# Patient Record
(demographics unavailable — no encounter records)

---

## 2024-11-01 NOTE — PHYSICAL EXAM
[Well Developed] : well developed [Well Nourished] : well nourished [No Acute Distress] : no acute distress [Normal Conjunctiva] : normal conjunctiva [Normal Venous Pressure] : normal venous pressure [No Carotid Bruit] : no carotid bruit [Normal S1, S2] : normal S1, S2 [No Murmur] : no murmur [No Rub] : no rub [No Gallop] : no gallop [Clear Lung Fields] : clear lung fields [Good Air Entry] : good air entry [No Respiratory Distress] : no respiratory distress  [Soft] : abdomen soft [Non Tender] : non-tender [No Masses/organomegaly] : no masses/organomegaly [Normal Bowel Sounds] : normal bowel sounds [Normal Gait] : normal gait [No Edema] : no edema [No Cyanosis] : no cyanosis [No Clubbing] : no clubbing [No Rash] : no rash [No Skin Lesions] : no skin lesions [Moves all extremities] : moves all extremities [No Focal Deficits] : no focal deficits [Normal Speech] : normal speech [Alert and Oriented] : alert and oriented [Normal memory] : normal memory [Venous varicosities] : venous varicosities

## 2024-11-01 NOTE — REVIEW OF SYSTEMS
[Palpitations] : palpitations [Negative] : Heme/Lymph [Feeling Fatigued] : feeling fatigued [Dizziness] : dizziness

## 2024-11-07 NOTE — ASSESSMENT
[FreeTextEntry1] : # abnorrnal ECG - inf Q waves on ECG - check TTE - check Event Monitor x 2 weeks  # palpitations - occur at rest and with activity - currently taking toprol 50 mg qd - does endorse occasional dizziness - decrease toprol to 25 mg qd - check Event Monitor  # HTN - BP today 100/59 - currently taking lisinopril 20 mg qd and toprol 50 mg qd - does endorse occasional dizziness - decrease toprol to 25 mg qd  # HLD - 9/2024 c/w LDL 63, HDL 49, ,  - cont rosuva 10 mg qd  RTC after

## 2024-11-07 NOTE — HISTORY OF PRESENT ILLNESS
[FreeTextEntry1] : 80F w/ HTN, CKD, HLD, and DM who presents for initial cardiac evaluation. Denies any c/o chest pain or ZHAO. Does endorse palpitations which occur randomly at both rest and with activity. Also endorses increased fatigue and occasional dizziness. Compliant w/ all medications. No c/o abnormal bleeding.   11/1/24 ECG: NSR at 79 bpm, IRBBB, LAE, inf Q waves

## 2024-12-16 NOTE — PHYSICAL EXAM
[Normal] : normal rate, regular rhythm, normal S1 and S2 and no murmur heard [No Edema] : there was no peripheral edema [Alert and Oriented x3] : oriented to person, place, and time

## 2024-12-16 NOTE — REVIEW OF SYSTEMS
[Joint Pain] : joint pain [Joint Stiffness] : joint stiffness [Back Pain] : back pain [Headache] : headache [Negative] : Heme/Lymph

## 2024-12-16 NOTE — HISTORY OF PRESENT ILLNESS
[Friend] : friend [de-identified] : 79 y/o female presents for f/u. Recently here to establish care. Labs then were ok. She didn't have records with her then or now. Saw cardiologist and audiologist. I had Suggested c-scopy given weight loss but declines.   I ordered DEXA which she hasn't gotten yet. she continues to come in for the same issue but hasn't followed the treatment I advised. Head/neck Pain occurs in the AM, very stiff. Sleeps with one skinny pillow. New mattress. Feels like hands are "Shaky". Pain when she flexes her neck but not on extension.  Had XRAY which showed multi-level degenerative changes. Doesn't want PT. Needs med reiflls.

## 2024-12-21 NOTE — HISTORY OF PRESENT ILLNESS
[FreeTextEntry1] : 80F w/ HTN, CKD, HLD, and DM who presents for cardiac evaluation. Denies any c/o chest pain or ZHAO. Does endorse palpitations which occur randomly at both rest and with activity. Also endorses increased fatigue and occasional dizziness. Compliant w/ all medications. No c/o abnormal bleeding.   12/13/24 OV: pt returns today for f/u and results of TTE and Event Monitor. Overall feeling well since last seen, no new complaints.   11/1/24 ECG: NSR at 79 bpm, IRBBB, LAE, inf Q waves

## 2024-12-21 NOTE — ASSESSMENT
[FreeTextEntry1] : # abnorrnal ECG - inf Q waves on ECG - 11/2024 TTE c/w nl LV size and sys fxn, EF 64%, Ao root 3.4 cm, mild MR, mild-mod TR - 11/2024 Event Monitor c/w min HR 48 bpm, max  bpm, avg HR 68 bpm; 16 runs of SVT w/ fastest interval lasting 6 beats w/ max rate of 174 bpm, longest lasting 16 beats w/ avg rate 120 bpm; possible atrial fibrillation/flutter? - check Event Monitor x 30 days  # palpitations - occur at rest and with activity - does endorse occasional dizziness - cont toprol to 25 mg qd - 11/2024 Event Monitor c/w min HR 48 bpm, max  bpm, avg HR 68 bpm; 16 runs of SVT w/ fastest interval lasting 6 beats w/ max rate of 174 bpm, longest lasting 16 beats w/ avg rate 120 bpm; possible atrial fibrillation/flutter? - check Event Monitor x 30 days  # HTN - BP today 130/70 - cont lisinopril 20 mg qd - cont toprol 25 mg qd - does endorse occasional dizziness  # HLD - 9/2024 c/w LDL 63, HDL 49, ,  - cont rosuva 10 mg qd  # DM - 9/2024 A1c 6.3% - stable for now  # headache - refer to neurology  RTC after.

## 2025-04-09 NOTE — HISTORY OF PRESENT ILLNESS
[Friend] : friend [de-identified] : Pt presents with friend for f/u. Needs refills. Needs labs. Has very itchy skin especially at night diffusely but in particular has an area on her R clavicle that has small itchy bumps. Concerned she has a "parasite".  Needs referral to Audiologist-hearing aides lost.

## 2025-04-09 NOTE — HEALTH RISK ASSESSMENT
[No falls in past year] : Patient reported no falls in the past year [0] : 2) Feeling down, depressed, or hopeless: Not at all (0) [PHQ-2 Negative - No further assessment needed] : PHQ-2 Negative - No further assessment needed [GAQ2Cgxej] : 0 [Never] : Never

## 2025-04-09 NOTE — REVIEW OF SYSTEMS
[Earache] : no earache [Hearing Loss] : hearing loss [Nasal Discharge] : no nasal discharge [Itching] : Itching [Skin Rash] : skin rash [Negative] : Heme/Lymph

## 2025-04-09 NOTE — PHYSICAL EXAM
[No Acute Distress] : no acute distress [Normal Sclera/Conjunctiva] : normal sclera/conjunctiva [Normal Outer Ear/Nose] : the outer ears and nose were normal in appearance [No JVD] : no jugular venous distention [No Respiratory Distress] : no respiratory distress  [No Accessory Muscle Use] : no accessory muscle use [No Edema] : there was no peripheral edema [Normal] : affect was normal and insight and judgment were intact [de-identified] : thin [de-identified] : area of dermatitis on R clavicle/neck

## 2025-04-09 NOTE — ASSESSMENT
[FreeTextEntry1] : Here for f/u. Labs sent. Meds refilled. Start topical steroid but refer to DERM. Reassured that she doesn't have a parasite.

## 2025-05-07 NOTE — ASSESSMENT
[FreeTextEntry1] : # palpitations/PVCs - occur at rest and with activity - does endorse occasional dizziness - cont toprol 25 mg qd - 11/2024 Event Monitor c/w min HR 48 bpm, max  bpm, avg HR 68 bpm; 16 runs of SVT w/ fastest interval lasting 6 beats w/ max rate of 174 bpm, longest lasting 16 beats w/ avg rate 120 bpm; possible atrial fibrillation/flutter? - 1/2025 Event Monitor c/w 4,218 PVCs, no evidence of AF - results discussed in detail with pt  - check MPI given PVC burden on Event Monitor  # abnormal ECG - inf Q waves on ECG - 11/2024 TTE c/w nl LV size and sys fxn, EF 64%, Ao root 3.4 cm, mild MR, mild-mod TR - 11/2024 Event Monitor c/w min HR 48 bpm, max  bpm, avg HR 68 bpm; 16 runs of SVT w/ fastest interval lasting 6 beats w/ max rate of 174 bpm, longest lasting 16 beats w/ avg rate 120 bpm; possible atrial fibrillation/flutter? - 1/2025 Event Monitor c/w 4,218 PVCs, no evidence of AF - results discussed in detail with pt   # HTN - BP today 120/60 - stable for now - cont lisinopril 20 mg qd - cont toprol 25 mg qd - does endorse occasional dizziness  # HLD - 9/2024 c/w LDL 63, HDL 49, ,  - cont rosuva 10 mg qd  # DM - 9/2024 A1c 6.3% - 12/2024 A1c 5.9% - stable for now  RTC after.

## 2025-05-07 NOTE — HISTORY OF PRESENT ILLNESS
[FreeTextEntry1] : 80F w/ HTN, CKD, HLD, and DM who presents for cardiac evaluation. Denies any c/o chest pain or ZHAO. Does endorse palpitations which occur randomly at both rest and with activity. Also endorses increased fatigue and occasional dizziness. Compliant w/ all medications. No c/o abnormal bleeding.   5/2/25 OV: pt returns today for f/u and results of 30-day Event Monitor. Overall feeling well, no new complaints. Denies any c/o chest pain or ZHAO. Compliant w/ all medications.  12/13/24 OV: pt returns today for f/u and results of TTE and Event Monitor. Overall feeling well since last seen, no new complaints.   5/2/25 ECG: NSR at 65 bpm, IRBBB, LAE, LVPL 11/1/24 ECG: NSR at 79 bpm, IRBBB, LAE, inf Q waves

## 2025-05-23 NOTE — ASSESSMENT
[FreeTextEntry1] : # palpitations/PVCs - occur at rest and with activity - does endorse occasional dizziness - cont toprol 25 mg qd - 11/2024 Event Monitor c/w min HR 48 bpm, max  bpm, avg HR 68 bpm; 16 runs of SVT w/ fastest interval lasting 6 beats w/ max rate of 174 bpm, longest lasting 16 beats w/ avg rate 120 bpm; possible atrial fibrillation/flutter? - 1/2025 Event Monitor c/w 4,218 PVCs, no evidence of AF - 5/2025 MPI wnl  - results discussed in detail with pt, pt reassured  # abnormal ECG - inf Q waves on ECG - 11/2024 TTE c/w nl LV size and sys fxn, EF 64%, Ao root 3.4 cm, mild MR, mild-mod TR - 11/2024 Event Monitor c/w min HR 48 bpm, max  bpm, avg HR 68 bpm; 16 runs of SVT w/ fastest interval lasting 6 beats w/ max rate of 174 bpm, longest lasting 16 beats w/ avg rate 120 bpm; possible atrial fibrillation/flutter? - 1/2025 Event Monitor c/w 4,218 PVCs, no evidence of AF - results discussed in detail with pt  # HTN - BP today 130/70 - stable for now - cont lisinopril 20 mg qd - cont toprol 25 mg qd - does endorse occasional dizziness  # HLD - 9/2024 c/w LDL 63, HDL 49, ,  - cont rosuva 10 mg qd  # DM - 9/2024 A1c 6.3% - 12/2024 A1c 5.9% - stable for now  RTC 6 months

## 2025-05-23 NOTE — HISTORY OF PRESENT ILLNESS
[FreeTextEntry1] : 80F w/ HTN, CKD, HLD, and DM who presents for cardiac evaluation. Denies any c/o chest pain or ZHAO. Does endorse palpitations which occur randomly at both rest and with activity. Also endorses increased fatigue and occasional dizziness. Compliant w/ all medications. No c/o abnormal bleeding.   5/23/25 OV: pt returns today for f/u and results of MPI. Overall feeling well, no new complaints.  5/2/25 OV: pt returns today for f/u and results of 30-day Event Monitor. Overall feeling well, no new complaints. Denies any c/o chest pain or ZHAO. Compliant w/ all medications.  12/13/24 OV: pt returns today for f/u and results of TTE and Event Monitor. Overall feeling well since last seen, no new complaints.   5/2/25 ECG: NSR at 65 bpm, IRBBB, LAE, LVPL 11/1/24 ECG: NSR at 79 bpm, IRBBB, LAE, inf Q waves

## 2025-06-09 NOTE — ASSESSMENT
[FreeTextEntry1] : #Milia right upper lip vermillion border - white papule on vermillion border - B9, removal is cosmetic, can treat with BCA  #Nevus under chin - tan brown papule with hair growing out of it  - no features concerning on dermoscopy - CTM  #Cherry angiomas - dome shaped red papules on trunk - B9, reassurance - Lesions can be treated with hyfrecation or laser for cosmetic reasons  #Pruritus - localized to scalp - no scale, no erythema, no skin surface changes noted - trial Rx synalar sol QHS M-F  RTC 4 weeks

## 2025-06-09 NOTE — HISTORY OF PRESENT ILLNESS
[FreeTextEntry1] : NPA- spot check [de-identified] : Marina Nichols 79 y/o F presents for bumps on upper lip and chin.  - Patient reports onset 4 months ago. -Patient states no symptoms or treatment for it.

## 2025-07-15 NOTE — REVIEW OF SYSTEMS
[Chest Pain] : no chest pain [Palpitations] : palpitations [Paroxysmal Nocturnal Dyspnea] : no paroxysmal nocturnal dyspnea [Abdominal Pain] : abdominal pain [Nausea] : no nausea [Constipation] : constipation [Vomiting] : no vomiting [Heartburn] : no heartburn [Joint Pain] : joint pain [Joint Stiffness] : joint stiffness [Back Pain] : back pain [Headache] : headache [Dizziness] : no dizziness [Memory Loss] : no memory loss [Unsteady Walking] : no ataxia [Negative] : Heme/Lymph

## 2025-07-15 NOTE — ASSESSMENT
[FreeTextEntry1] : Labs sent. I again recommended PT. As she is not homebound, she needs to make appt with PT office. Advised heat regularly on neck/shoulders. Referred to podiatry. Advised on Gas-X or Jackson for gastritis, gas pain.

## 2025-07-15 NOTE — HISTORY OF PRESENT ILLNESS
[Family Member] : family member [de-identified] : Here for f/u. Since last visit: saw ANITA MAHONE[facility].  5/2025 MPI WNL Last lipids 9/24. On statin.  Due for labs  COntinues to have neck pain that radiates to posterior head. Has CT neck from 2022 and xrays from the fall which both show disc disease. I have recommended PT several times but she is frsutrated that her insurance isn't covering home PT.   Has intermittent LUQ pain. +COnstipation.  Due to see podiatry.